# Patient Record
Sex: FEMALE | Race: WHITE
[De-identification: names, ages, dates, MRNs, and addresses within clinical notes are randomized per-mention and may not be internally consistent; named-entity substitution may affect disease eponyms.]

---

## 2023-01-11 ENCOUNTER — HOSPITAL ENCOUNTER (INPATIENT)
Dept: HOSPITAL 54 - ER | Age: 78
LOS: 6 days | Discharge: NURSING, REHAB OR LONG-TERM CARE | DRG: 193 | End: 2023-01-17
Attending: INTERNAL MEDICINE | Admitting: NURSE PRACTITIONER
Payer: MEDICARE

## 2023-01-11 VITALS — HEIGHT: 64 IN | BODY MASS INDEX: 25.61 KG/M2 | WEIGHT: 150 LBS

## 2023-01-11 VITALS — DIASTOLIC BLOOD PRESSURE: 97 MMHG | SYSTOLIC BLOOD PRESSURE: 149 MMHG

## 2023-01-11 VITALS — DIASTOLIC BLOOD PRESSURE: 68 MMHG | SYSTOLIC BLOOD PRESSURE: 89 MMHG

## 2023-01-11 DIAGNOSIS — L89.896: ICD-10-CM

## 2023-01-11 DIAGNOSIS — R53.2: ICD-10-CM

## 2023-01-11 DIAGNOSIS — M24.562: ICD-10-CM

## 2023-01-11 DIAGNOSIS — E87.6: ICD-10-CM

## 2023-01-11 DIAGNOSIS — K29.40: ICD-10-CM

## 2023-01-11 DIAGNOSIS — L89.319: ICD-10-CM

## 2023-01-11 DIAGNOSIS — E11.22: ICD-10-CM

## 2023-01-11 DIAGNOSIS — F03.90: ICD-10-CM

## 2023-01-11 DIAGNOSIS — E88.09: ICD-10-CM

## 2023-01-11 DIAGNOSIS — E87.0: ICD-10-CM

## 2023-01-11 DIAGNOSIS — I12.9: ICD-10-CM

## 2023-01-11 DIAGNOSIS — K21.9: ICD-10-CM

## 2023-01-11 DIAGNOSIS — J15.9: Primary | ICD-10-CM

## 2023-01-11 DIAGNOSIS — Z79.899: ICD-10-CM

## 2023-01-11 DIAGNOSIS — E86.0: ICD-10-CM

## 2023-01-11 DIAGNOSIS — Z79.01: ICD-10-CM

## 2023-01-11 DIAGNOSIS — J98.11: ICD-10-CM

## 2023-01-11 DIAGNOSIS — Z20.822: ICD-10-CM

## 2023-01-11 DIAGNOSIS — Z79.84: ICD-10-CM

## 2023-01-11 DIAGNOSIS — N18.9: ICD-10-CM

## 2023-01-11 DIAGNOSIS — R13.10: ICD-10-CM

## 2023-01-11 DIAGNOSIS — D63.8: ICD-10-CM

## 2023-01-11 DIAGNOSIS — R62.7: ICD-10-CM

## 2023-01-11 DIAGNOSIS — I25.2: ICD-10-CM

## 2023-01-11 DIAGNOSIS — Z91.018: ICD-10-CM

## 2023-01-11 DIAGNOSIS — Z86.73: ICD-10-CM
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
## 2023-01-11 DIAGNOSIS — E87.8: ICD-10-CM

## 2023-01-11 DIAGNOSIS — I25.10: ICD-10-CM

## 2023-01-11 DIAGNOSIS — J90: ICD-10-CM

## 2023-01-11 DIAGNOSIS — M24.561: ICD-10-CM

## 2023-01-11 DIAGNOSIS — L89.156: ICD-10-CM

## 2023-01-11 DIAGNOSIS — K74.60: ICD-10-CM

## 2023-01-11 LAB
ALBUMIN SERPL BCP-MCNC: 0.9 G/DL (ref 3.4–5)
ALP SERPL-CCNC: 112 U/L (ref 46–116)
ALT SERPL W P-5'-P-CCNC: 8 U/L (ref 12–78)
AST SERPL W P-5'-P-CCNC: 26 U/L (ref 15–37)
BASOPHILS # BLD AUTO: 0 K/UL (ref 0–0.2)
BASOPHILS NFR BLD AUTO: 0.3 % (ref 0–2)
BILIRUB DIRECT SERPL-MCNC: 0.3 MG/DL (ref 0–0.2)
BILIRUB SERPL-MCNC: 0.6 MG/DL (ref 0.2–1)
BILIRUB UR QL STRIP: (no result)
BUN SERPL-MCNC: 16 MG/DL (ref 7–18)
CALCIUM SERPL-MCNC: 7.2 MG/DL (ref 8.5–10.1)
CHLORIDE SERPL-SCNC: 130 MMOL/L (ref 98–107)
CO2 SERPL-SCNC: 18 MMOL/L (ref 21–32)
COLOR UR: YELLOW
CREAT SERPL-MCNC: 1.1 MG/DL (ref 0.6–1.3)
DEPRECATED SQUAMOUS URNS QL MICRO: (no result) /HPF
EOSINOPHIL NFR BLD AUTO: 3.6 % (ref 0–6)
GLUCOSE SERPL-MCNC: 88 MG/DL (ref 74–106)
GLUCOSE UR STRIP-MCNC: NEGATIVE MG/DL
HCT VFR BLD AUTO: 35 % (ref 33–45)
HGB BLD-MCNC: 10.5 G/DL (ref 11.5–14.8)
LEUKOCYTE ESTERASE UR QL STRIP: NEGATIVE
LYMPHOCYTES NFR BLD AUTO: 3.8 K/UL (ref 0.8–4.8)
LYMPHOCYTES NFR BLD AUTO: 33.6 % (ref 20–44)
LYMPHOCYTES NFR BLD MANUAL: 22 % (ref 16–48)
MCHC RBC AUTO-ENTMCNC: 30 G/DL (ref 31–36)
MCV RBC AUTO: 116 FL (ref 82–100)
MONOCYTES NFR BLD AUTO: 0.6 K/UL (ref 0.1–1.3)
MONOCYTES NFR BLD AUTO: 5.3 % (ref 2–12)
MONOCYTES NFR BLD MANUAL: 3 % (ref 0–11)
NEUTROPHILS # BLD AUTO: 6.5 K/UL (ref 1.8–8.9)
NEUTROPHILS NFR BLD AUTO: 57.2 % (ref 43–81)
NEUTS BAND NFR BLD MANUAL: 1 % (ref 0–5)
NEUTS SEG NFR BLD MANUAL: 74 % (ref 42–76)
NITRITE UR QL STRIP: NEGATIVE
PH UR STRIP: 5.5 [PH] (ref 5–8)
PLATELET # BLD AUTO: 428 K/UL (ref 150–450)
POTASSIUM SERPL-SCNC: 3.9 MMOL/L (ref 3.5–5.1)
PROT SERPL-MCNC: 5.6 G/DL (ref 6.4–8.2)
PROT UR QL STRIP: NEGATIVE MG/DL
RBC # BLD AUTO: 3.06 MIL/UL (ref 4–5.2)
RBC #/AREA URNS HPF: (no result) /HPF (ref 0–2)
SODIUM SERPL-SCNC: 158 MMOL/L (ref 136–145)
UROBILINOGEN UR STRIP-MCNC: 0.2 EU/DL
WBC #/AREA URNS HPF: (no result) /HPF
WBC #/AREA URNS HPF: (no result) /HPF (ref 0–3)
WBC NRBC COR # BLD AUTO: 11.4 K/UL (ref 4.3–11)

## 2023-01-11 PROCEDURE — G0378 HOSPITAL OBSERVATION PER HR: HCPCS

## 2023-01-11 PROCEDURE — C9113 INJ PANTOPRAZOLE SODIUM, VIA: HCPCS

## 2023-01-11 PROCEDURE — C9803 HOPD COVID-19 SPEC COLLECT: HCPCS

## 2023-01-11 RX ADMIN — HEPARIN SODIUM SCH UNITS: 5000 INJECTION INTRAVENOUS; SUBCUTANEOUS at 21:37

## 2023-01-11 RX ADMIN — EZETIMIBE SCH MG: 10 TABLET ORAL at 21:36

## 2023-01-11 RX ADMIN — DEXTROSE MONOHYDRATE SCH MLS/HR: 50 INJECTION, SOLUTION INTRAVENOUS at 21:52

## 2023-01-11 RX ADMIN — DEXTROSE AND SODIUM CHLORIDE PRN MLS/HR: 5; 450 INJECTION, SOLUTION INTRAVENOUS at 21:22

## 2023-01-11 NOTE — NUR
TEL  RN  ADMITTING  NOTES  



RECIEVED PATIENT  FROM  ER  [facility]IA [facility]  ,  WITH  DX  OF  FAILURE   TO  THRIVE   , FULL  CODE 
 ,  A/O  X0   , NON VERBAL AND   OPEN EYES   ONLY ,   ROOM  AIR   AND  TOLERATED  WELL  , NO 
 OB OR   DISTRESS  NOTED  AND  NO  S/S  OF   PAIN  AND  DISCOMFORT  NOTED  ,    BODY  
ASSESSMENT DONE  AND  PHOTOS  WERE  TAKEN ,   KEPT   CLEAN AND  DRY  ,   V/S  TAKEN AND  
RECORDED  ,  TELE  MONITOR   ATTACHED   READING OF   SR  WITH  BBB  PAC  AND  HR  OF  82  ,  
SACRAL  DTI  , BOTH HEEL  REDNESS  ,   LEFT  FOOT  DTI  SITE  #1  AND   2  AND   RIGHT  FOOT 
 DTI  SITE   #1 AND  2   AND   WOUND  CONSULT  ORDERED  ,   IV  ACCESS  ON  LAC  #20G  ,  
SAFETY  PRECAUTIONS  PROVIDED ,   SR  UP  X  2 , KEPT   COMFORTABLE   TO BED  , FAMILY  AT 
BEDSIDE  ,   ALL  NEEDS  ATTENDED  , ENDORSED  TO  NEXT SHIFT

## 2023-01-11 NOTE — NUR
PT TRANSFERRED -2 VIA [facility] ACLS PROTOCOL. WARM HANDOFF GIVEN TO BRANT WOODY.

## 2023-01-11 NOTE — NUR
MS LVN INITIAL NOTES

RECEIVED PT  LYING IN BED WITH EYES CLOSED BUT AROUSE EASILY WHEN YOU CALLED HER NAME AND 
TOUCH. RESPIRATION EVEN AND NON-LABORED. SKIN WARM TO TOUCH.  NOT IN ANY ACUTE DISTRESS 
NOTED. RE-ORIENTED WHERE SHE AT AND HOW TO USED THE CALL LIGHT SYSTEM. KEPT HER WARM AND 
COMFORTABLE AT ALL TIMES. BED IN LOW AND LOCK IN POSITION WITH SIDE RAILS X2 UP AND BED 
ALARM SET FOR SAFETY. WILL CONTINUE MONITORING.

## 2023-01-12 VITALS — SYSTOLIC BLOOD PRESSURE: 131 MMHG | DIASTOLIC BLOOD PRESSURE: 68 MMHG

## 2023-01-12 VITALS — SYSTOLIC BLOOD PRESSURE: 112 MMHG | DIASTOLIC BLOOD PRESSURE: 66 MMHG

## 2023-01-12 VITALS — SYSTOLIC BLOOD PRESSURE: 123 MMHG | DIASTOLIC BLOOD PRESSURE: 68 MMHG

## 2023-01-12 LAB
BASOPHILS # BLD AUTO: 0 K/UL (ref 0–0.2)
BASOPHILS NFR BLD AUTO: 0.3 % (ref 0–2)
BUN SERPL-MCNC: 15 MG/DL (ref 7–18)
CALCIUM SERPL-MCNC: 6.9 MG/DL (ref 8.5–10.1)
CHLORIDE SERPL-SCNC: 128 MMOL/L (ref 98–107)
CO2 SERPL-SCNC: 17 MMOL/L (ref 21–32)
CREAT SERPL-MCNC: 1.1 MG/DL (ref 0.6–1.3)
EOSINOPHIL NFR BLD AUTO: 2.7 % (ref 0–6)
GLUCOSE SERPL-MCNC: 115 MG/DL (ref 74–106)
HCT VFR BLD AUTO: 32 % (ref 33–45)
HGB BLD-MCNC: 9.8 G/DL (ref 11.5–14.8)
LYMPHOCYTES NFR BLD AUTO: 19.5 % (ref 20–44)
LYMPHOCYTES NFR BLD AUTO: 2.6 K/UL (ref 0.8–4.8)
MAGNESIUM SERPL-MCNC: 2.3 MG/DL (ref 1.8–2.4)
MCHC RBC AUTO-ENTMCNC: 30 G/DL (ref 31–36)
MCV RBC AUTO: 112 FL (ref 82–100)
MONOCYTES NFR BLD AUTO: 0.5 K/UL (ref 0.1–1.3)
MONOCYTES NFR BLD AUTO: 3.8 % (ref 2–12)
NEUTROPHILS # BLD AUTO: 10 K/UL (ref 1.8–8.9)
NEUTROPHILS NFR BLD AUTO: 73.7 % (ref 43–81)
PHOSPHATE SERPL-MCNC: 3 MG/DL (ref 2.5–4.9)
PLATELET # BLD AUTO: 430 K/UL (ref 150–450)
POTASSIUM SERPL-SCNC: 3.2 MMOL/L (ref 3.5–5.1)
RBC # BLD AUTO: 2.87 MIL/UL (ref 4–5.2)
SODIUM SERPL-SCNC: 156 MMOL/L (ref 136–145)
TSH SERPL DL<=0.005 MIU/L-ACNC: 1.83 UIU/ML (ref 0.36–3.74)
WBC NRBC COR # BLD AUTO: 13.6 K/UL (ref 4.3–11)

## 2023-01-12 PROCEDURE — 05HB33Z INSERTION OF INFUSION DEVICE INTO RIGHT BASILIC VEIN, PERCUTANEOUS APPROACH: ICD-10-PCS | Performed by: NURSE PRACTITIONER

## 2023-01-12 RX ADMIN — METOPROLOL TARTRATE SCH MG: 25 TABLET, FILM COATED ORAL at 17:00

## 2023-01-12 RX ADMIN — METFORMIN HYDROCHLORIDE SCH MG: 500 TABLET, FILM COATED ORAL at 09:00

## 2023-01-12 RX ADMIN — AMLODIPINE BESYLATE SCH MG: 5 TABLET ORAL at 09:00

## 2023-01-12 RX ADMIN — LOSARTAN POTASSIUM SCH MG: 50 TABLET, FILM COATED ORAL at 17:00

## 2023-01-12 RX ADMIN — RIVAROXABAN SCH MG: 10 TABLET, FILM COATED ORAL at 09:00

## 2023-01-12 RX ADMIN — Medication SCH ML: at 09:00

## 2023-01-12 RX ADMIN — METFORMIN HYDROCHLORIDE SCH MG: 500 TABLET, FILM COATED ORAL at 17:00

## 2023-01-12 RX ADMIN — HEPARIN SODIUM SCH UNITS: 5000 INJECTION INTRAVENOUS; SUBCUTANEOUS at 20:51

## 2023-01-12 RX ADMIN — Medication SCH MG: at 17:00

## 2023-01-12 RX ADMIN — MINERAL SUPPLEMENT IRON 300 MG / 5 ML STRENGTH LIQUID 100 PER BOX UNFLAVORED SCH MG: at 09:00

## 2023-01-12 RX ADMIN — Medication SCH TAB: at 09:00

## 2023-01-12 RX ADMIN — LOSARTAN POTASSIUM SCH MG: 50 TABLET, FILM COATED ORAL at 09:00

## 2023-01-12 RX ADMIN — METOPROLOL TARTRATE SCH MG: 25 TABLET, FILM COATED ORAL at 09:00

## 2023-01-12 RX ADMIN — EZETIMIBE SCH MG: 10 TABLET ORAL at 21:11

## 2023-01-12 RX ADMIN — DEXTROSE AND SODIUM CHLORIDE PRN MLS/HR: 5; 450 INJECTION, SOLUTION INTRAVENOUS at 20:17

## 2023-01-12 RX ADMIN — Medication SCH ML: at 13:00

## 2023-01-12 RX ADMIN — SODIUM CHLORIDE SCH MG: 9 INJECTION, SOLUTION INTRAVENOUS at 09:28

## 2023-01-12 RX ADMIN — DEXTROSE MONOHYDRATE SCH MLS/HR: 50 INJECTION, SOLUTION INTRAVENOUS at 20:15

## 2023-01-12 RX ADMIN — LATANOPROST SCH ML: 50 SOLUTION OPHTHALMIC at 21:10

## 2023-01-12 RX ADMIN — HEPARIN SODIUM SCH UNITS: 5000 INJECTION INTRAVENOUS; SUBCUTANEOUS at 09:28

## 2023-01-12 RX ADMIN — RIVAROXABAN SCH MG: 10 TABLET, FILM COATED ORAL at 17:00

## 2023-01-12 RX ADMIN — Medication SCH MG: at 09:00

## 2023-01-12 RX ADMIN — Medication SCH DROP: at 18:59

## 2023-01-12 NOTE — NUR
MS RN NOTES

KALPESH APRESOLINE 25MG PO AND ZETIA 10MG PO HELD,PATIENT FAILED SWALLOW EVAL PER SPEECH 
THERAPIST.PRONE TO ASPIRATION

## 2023-01-12 NOTE — NUR
ms rn

received on bed, sleeping easily arousable, not in any form of distress,respirations even 
and unlabored, no son noted, lungs are  diminish sounds,no s/sof pain at this time, 
repositioned for comfort,all needs attended.

## 2023-01-12 NOTE — NUR
LVN NOTES

 PT SLEEPING COMFORTABLY IN BED WITHOUT ANY ACUTE DISTRESS NOTED. IVF STILL INFUSING. KEPT 
HER WARM AND COMFORTABLE AT ALL TIMES. WILL CONTINUE MONITORING.

## 2023-01-12 NOTE — NUR
ms rn

held medications at this time, patient's daughter, wanted to speak w/ rebekah, gave 
daughter's tel number,rebekah will call her,.

## 2023-01-12 NOTE — NUR
MS RN NOTES

RECEIVED LAYING ON BED ON LEFT SIDE POSITION,BREATHING NON LABORED,NON VERBAL,OPEN EYES.NPO 
EXCEPT MEDS,PRESENT IVF INFUSING ON LEFT AC VIA IV PUMP,SITE PATENT,NO S/S OF INFILTRATION 
NOTED.WILL REPOSITION PER PROTOCOL,WILL CONTINUE TO MONITOR STATUS.

## 2023-01-12 NOTE — NUR
WOUND CARE CONSULT: PT PRESENTS WITH UPPER EXTREMITY DISCOLORATION, SACRAL SCARRING WHICH IS 
FRAGILE AND HAS SURROUNDING INTACT DEEP TISSUE INJURY, DEEP TISSUE INJURIES TO FEET AND 
LOWER EXTREMITY CONTRACTURES. DR MAGANA AND DR REY CALLED FOR SURGICAL AND DPM 
CONSULTS. DISCUSSED SKIN PROTECTION WITH NURSING STAFF. PT TO BE PLACED ON [facility] 
LOW AIRLOSS BED. MD IN AGREEMENT WITH PLAN OF CARE.

## 2023-01-13 VITALS — DIASTOLIC BLOOD PRESSURE: 50 MMHG | SYSTOLIC BLOOD PRESSURE: 95 MMHG

## 2023-01-13 VITALS — SYSTOLIC BLOOD PRESSURE: 122 MMHG | DIASTOLIC BLOOD PRESSURE: 68 MMHG

## 2023-01-13 VITALS — SYSTOLIC BLOOD PRESSURE: 100 MMHG | DIASTOLIC BLOOD PRESSURE: 55 MMHG

## 2023-01-13 LAB
BASOPHILS # BLD AUTO: 0 K/UL (ref 0–0.2)
BASOPHILS NFR BLD AUTO: 0.4 % (ref 0–2)
BUN SERPL-MCNC: 13 MG/DL (ref 7–18)
CALCIUM SERPL-MCNC: 7 MG/DL (ref 8.5–10.1)
CHLORIDE SERPL-SCNC: 128 MMOL/L (ref 98–107)
CO2 SERPL-SCNC: 18 MMOL/L (ref 21–32)
CREAT SERPL-MCNC: 1.1 MG/DL (ref 0.6–1.3)
EOSINOPHIL NFR BLD AUTO: 4.2 % (ref 0–6)
GLUCOSE SERPL-MCNC: 113 MG/DL (ref 74–106)
HCT VFR BLD AUTO: 28 % (ref 33–45)
HGB BLD-MCNC: 8.7 G/DL (ref 11.5–14.8)
LYMPHOCYTES NFR BLD AUTO: 2.9 K/UL (ref 0.8–4.8)
LYMPHOCYTES NFR BLD AUTO: 26.9 % (ref 20–44)
MAGNESIUM SERPL-MCNC: 2 MG/DL (ref 1.8–2.4)
MCHC RBC AUTO-ENTMCNC: 32 G/DL (ref 31–36)
MCV RBC AUTO: 110 FL (ref 82–100)
MONOCYTES NFR BLD AUTO: 0.6 K/UL (ref 0.1–1.3)
MONOCYTES NFR BLD AUTO: 5.5 % (ref 2–12)
NEUTROPHILS # BLD AUTO: 6.7 K/UL (ref 1.8–8.9)
NEUTROPHILS NFR BLD AUTO: 63 % (ref 43–81)
PHOSPHATE SERPL-MCNC: 2.7 MG/DL (ref 2.5–4.9)
PLATELET # BLD AUTO: 371 K/UL (ref 150–450)
POTASSIUM SERPL-SCNC: 2.8 MMOL/L (ref 3.5–5.1)
RBC # BLD AUTO: 2.51 MIL/UL (ref 4–5.2)
SODIUM SERPL-SCNC: 157 MMOL/L (ref 136–145)
WBC NRBC COR # BLD AUTO: 10.6 K/UL (ref 4.3–11)

## 2023-01-13 RX ADMIN — AMLODIPINE BESYLATE SCH MG: 5 TABLET ORAL at 09:00

## 2023-01-13 RX ADMIN — MINERAL SUPPLEMENT IRON 300 MG / 5 ML STRENGTH LIQUID 100 PER BOX UNFLAVORED SCH MG: at 09:00

## 2023-01-13 RX ADMIN — LOSARTAN POTASSIUM SCH MG: 50 TABLET, FILM COATED ORAL at 09:00

## 2023-01-13 RX ADMIN — SODIUM CHLORIDE, SODIUM LACTATE, POTASSIUM CHLORIDE, AND CALCIUM CHLORIDE SCH MLS/HR: .6; .31; .03; .02 INJECTION, SOLUTION INTRAVENOUS at 12:23

## 2023-01-13 RX ADMIN — METFORMIN HYDROCHLORIDE SCH MG: 500 TABLET, FILM COATED ORAL at 17:00

## 2023-01-13 RX ADMIN — Medication SCH ML: at 19:00

## 2023-01-13 RX ADMIN — Medication SCH MG: at 09:00

## 2023-01-13 RX ADMIN — HEPARIN SODIUM SCH UNITS: 5000 INJECTION INTRAVENOUS; SUBCUTANEOUS at 21:19

## 2023-01-13 RX ADMIN — Medication SCH ML: at 11:00

## 2023-01-13 RX ADMIN — METOPROLOL TARTRATE SCH MG: 25 TABLET, FILM COATED ORAL at 09:00

## 2023-01-13 RX ADMIN — Medication SCH DROP: at 16:55

## 2023-01-13 RX ADMIN — LOSARTAN POTASSIUM SCH MG: 50 TABLET, FILM COATED ORAL at 16:36

## 2023-01-13 RX ADMIN — SODIUM CHLORIDE SCH MG: 9 INJECTION, SOLUTION INTRAVENOUS at 09:53

## 2023-01-13 RX ADMIN — METOPROLOL TARTRATE SCH MG: 25 TABLET, FILM COATED ORAL at 16:36

## 2023-01-13 RX ADMIN — Medication SCH DROP: at 12:39

## 2023-01-13 RX ADMIN — Medication SCH MG: at 17:00

## 2023-01-13 RX ADMIN — Medication SCH TAB: at 09:00

## 2023-01-13 RX ADMIN — LATANOPROST SCH ML: 50 SOLUTION OPHTHALMIC at 21:18

## 2023-01-13 RX ADMIN — EZETIMIBE SCH MG: 10 TABLET ORAL at 21:19

## 2023-01-13 RX ADMIN — RIVAROXABAN SCH MG: 10 TABLET, FILM COATED ORAL at 17:00

## 2023-01-13 RX ADMIN — DEXTROSE MONOHYDRATE SCH MLS/HR: 50 INJECTION, SOLUTION INTRAVENOUS at 19:31

## 2023-01-13 RX ADMIN — Medication SCH DROP: at 11:14

## 2023-01-13 RX ADMIN — HEPARIN SODIUM SCH UNITS: 5000 INJECTION INTRAVENOUS; SUBCUTANEOUS at 09:54

## 2023-01-13 RX ADMIN — Medication SCH ML: at 17:00

## 2023-01-13 RX ADMIN — RIVAROXABAN SCH MG: 10 TABLET, FILM COATED ORAL at 09:00

## 2023-01-13 RX ADMIN — METFORMIN HYDROCHLORIDE SCH MG: 500 TABLET, FILM COATED ORAL at 09:00

## 2023-01-13 NOTE — NUR
ms rn

patient on bed, awake, non verbal patient, not in any form of distress, respirations even 
and unlabored,no sob noted, left arm edema noted, repositioned for comfort,all needs 
attended.

## 2023-01-13 NOTE — NUR
ms rn

held b/p meds, b/p on lower side, held colace, having loose stools, held xarelto, needs to 
verify md, patient already on heparin  q 12 hours, held glucophage, patient not eating,will 
monitor patient.

## 2023-01-13 NOTE — NUR
MS RN NOTES

NO SIGNIFICANT CHANGE IN STATUS.IVF INFUSING WELL ON LFA MIDLINE.MORNING CARE RENDERED,KEPT 
NPO,ASPIRATION PRECAUTION.IN NO ACUTE DISTRESS.

## 2023-01-13 NOTE — NUR
MS RN OPENING NOTES



Received patient on bed, awake and non verbal. No any signs of distress noted. Respirations 
even and unlabored. No sob noted. Safety measures given with bed on low position and locked. 
Side rails up x 2. Call light placed within reach. Will continue the plan of care.

## 2023-01-13 NOTE — NUR
ms rn

other daughter in the room asking to call saji first before ngt insertion, called saji 
will wait to call back.

## 2023-01-13 NOTE — NUR
ms rn

spoke w/ daughter Fiona , that we need to insert ngt to her mom, for water therapy,na is 
high and was agreed.

## 2023-01-14 VITALS — SYSTOLIC BLOOD PRESSURE: 106 MMHG | DIASTOLIC BLOOD PRESSURE: 66 MMHG

## 2023-01-14 VITALS — SYSTOLIC BLOOD PRESSURE: 95 MMHG | DIASTOLIC BLOOD PRESSURE: 47 MMHG

## 2023-01-14 VITALS — DIASTOLIC BLOOD PRESSURE: 60 MMHG | SYSTOLIC BLOOD PRESSURE: 110 MMHG

## 2023-01-14 LAB
BASOPHILS # BLD AUTO: 0 K/UL (ref 0–0.2)
BASOPHILS NFR BLD AUTO: 0.4 % (ref 0–2)
BUN SERPL-MCNC: 10 MG/DL (ref 7–18)
CALCIUM SERPL-MCNC: 6.9 MG/DL (ref 8.5–10.1)
CHLORIDE SERPL-SCNC: 123 MMOL/L (ref 98–107)
CO2 SERPL-SCNC: 19 MMOL/L (ref 21–32)
CREAT SERPL-MCNC: 1.1 MG/DL (ref 0.6–1.3)
EOSINOPHIL NFR BLD AUTO: 4.5 % (ref 0–6)
GLUCOSE SERPL-MCNC: 99 MG/DL (ref 74–106)
HCT VFR BLD AUTO: 29 % (ref 33–45)
HGB BLD-MCNC: 9.1 G/DL (ref 11.5–14.8)
LYMPHOCYTES NFR BLD AUTO: 3.6 K/UL (ref 0.8–4.8)
LYMPHOCYTES NFR BLD AUTO: 36.4 % (ref 20–44)
MAGNESIUM SERPL-MCNC: 1.9 MG/DL (ref 1.8–2.4)
MCHC RBC AUTO-ENTMCNC: 31 G/DL (ref 31–36)
MCV RBC AUTO: 114 FL (ref 82–100)
MONOCYTES NFR BLD AUTO: 0.6 K/UL (ref 0.1–1.3)
MONOCYTES NFR BLD AUTO: 6.4 % (ref 2–12)
NEUTROPHILS # BLD AUTO: 5.1 K/UL (ref 1.8–8.9)
NEUTROPHILS NFR BLD AUTO: 52.3 % (ref 43–81)
PHOSPHATE SERPL-MCNC: 2.6 MG/DL (ref 2.5–4.9)
PLATELET # BLD AUTO: 240 K/UL (ref 150–450)
POTASSIUM SERPL-SCNC: 3.8 MMOL/L (ref 3.5–5.1)
RBC # BLD AUTO: 2.59 MIL/UL (ref 4–5.2)
SODIUM SERPL-SCNC: 151 MMOL/L (ref 136–145)
WBC NRBC COR # BLD AUTO: 9.8 K/UL (ref 4.3–11)

## 2023-01-14 RX ADMIN — Medication SCH DROP: at 16:53

## 2023-01-14 RX ADMIN — Medication SCH MG: at 09:00

## 2023-01-14 RX ADMIN — RIVAROXABAN SCH MG: 10 TABLET, FILM COATED ORAL at 09:00

## 2023-01-14 RX ADMIN — Medication SCH ML: at 16:26

## 2023-01-14 RX ADMIN — RIVAROXABAN SCH MG: 10 TABLET, FILM COATED ORAL at 16:27

## 2023-01-14 RX ADMIN — AMLODIPINE BESYLATE SCH MG: 5 TABLET ORAL at 09:56

## 2023-01-14 RX ADMIN — Medication SCH ML: at 08:00

## 2023-01-14 RX ADMIN — Medication SCH MG: at 16:26

## 2023-01-14 RX ADMIN — DEXTROSE MONOHYDRATE SCH MLS/HR: 50 INJECTION, SOLUTION INTRAVENOUS at 21:08

## 2023-01-14 RX ADMIN — Medication SCH ML: at 08:52

## 2023-01-14 RX ADMIN — Medication SCH DROP: at 14:10

## 2023-01-14 RX ADMIN — METOPROLOL TARTRATE SCH MG: 25 TABLET, FILM COATED ORAL at 09:56

## 2023-01-14 RX ADMIN — LOSARTAN POTASSIUM SCH MG: 50 TABLET, FILM COATED ORAL at 09:56

## 2023-01-14 RX ADMIN — HEPARIN SODIUM SCH UNITS: 5000 INJECTION INTRAVENOUS; SUBCUTANEOUS at 21:00

## 2023-01-14 RX ADMIN — SODIUM CHLORIDE SCH MG: 9 INJECTION, SOLUTION INTRAVENOUS at 09:56

## 2023-01-14 RX ADMIN — Medication SCH TAB: at 09:55

## 2023-01-14 RX ADMIN — LOSARTAN POTASSIUM SCH MG: 50 TABLET, FILM COATED ORAL at 16:26

## 2023-01-14 RX ADMIN — METOPROLOL TARTRATE SCH MG: 25 TABLET, FILM COATED ORAL at 16:27

## 2023-01-14 RX ADMIN — METFORMIN HYDROCHLORIDE SCH MG: 500 TABLET, FILM COATED ORAL at 16:27

## 2023-01-14 RX ADMIN — METFORMIN HYDROCHLORIDE SCH MG: 500 TABLET, FILM COATED ORAL at 09:00

## 2023-01-14 RX ADMIN — HEPARIN SODIUM SCH UNITS: 5000 INJECTION INTRAVENOUS; SUBCUTANEOUS at 09:57

## 2023-01-14 RX ADMIN — LATANOPROST SCH ML: 50 SOLUTION OPHTHALMIC at 22:51

## 2023-01-14 RX ADMIN — Medication SCH MG: at 09:55

## 2023-01-14 RX ADMIN — SODIUM CHLORIDE, SODIUM LACTATE, POTASSIUM CHLORIDE, AND CALCIUM CHLORIDE SCH MLS/HR: .6; .31; .03; .02 INJECTION, SOLUTION INTRAVENOUS at 02:50

## 2023-01-14 RX ADMIN — Medication SCH DROP: at 11:09

## 2023-01-14 RX ADMIN — MINERAL SUPPLEMENT IRON 300 MG / 5 ML STRENGTH LIQUID 100 PER BOX UNFLAVORED SCH MG: at 09:55

## 2023-01-14 RX ADMIN — EZETIMIBE SCH MG: 10 TABLET ORAL at 22:00

## 2023-01-14 RX ADMIN — SODIUM CHLORIDE, SODIUM LACTATE, POTASSIUM CHLORIDE, AND CALCIUM CHLORIDE SCH MLS/HR: .6; .31; .03; .02 INJECTION, SOLUTION INTRAVENOUS at 16:45

## 2023-01-14 NOTE — NUR
ms rn

received on bed, nonverbal,responsive to stimuli, not in any form of distress, respirations 
even and unlabored breathing, no sob noted, ngt intact and in place w/o 
residual.repositioned for comfort,all needs attended.

## 2023-01-14 NOTE — NUR
MS RN CLOSING NOTES



Patient on bed awake, opening eyes and non verbal. No any signs of distress noted. 
Respirations even and unlabored. No sob noted. Patient has MIDLINE at VANESA with K Cl 20meq 
D5W running 70mls/hr, infusing well. Has NGT at the L nares. Safety measures given with bed 
on low position and locked. Side rails up x 2. Call light placed within reach. Will endorse 
to the next shift for continuity of care.

## 2023-01-14 NOTE — NUR
MS RN CLOSING NOTES



PATIENT LAYING IN BED, ALERT BUT NON-VERBAL, TOLERATING WELL ON ROOM AIR WITH NO S/S 
RESPIRATORY DISTRESS. NO COMPLAINTS OF PAIN OR DISCOMFORT AT THIS TIME. NG TUBE IN PLACE IN 
L NARE, CLAMPED. VANESA MIDLINE IN PLACE WITH D5W + 20 MEQ POTASSIUM INFUSING @ 70 ML/HR. 
SAFETY MEASURES IN PLACE: BED IN LOWEST LOCKED POSITION, SIDE RAILS UP X 2, CALL LIGHT 
WITHIN REACH. ALL NEEDS MET. WILL ENDORSE TO NIGHT SHIFT FOR KAMERON.

## 2023-01-14 NOTE — NUR
MS LVN INITIAL NOTES

 RECEIVED REPORT FROM AM NURSE ROSEANNA AND CHECKED PT SHE'S LYING IN BED WITH EYES CLOSED 
AROUSE TO TOUCH . RESPIRATION EVEN AND UNLABORED, NOT IN ANY ACUTE DISTRESS NOTED STILL IVF 
OF D51/2 NS +20MEQ K at 70ml/hr INFUSING ON HER RIGHT UPPER  ARM MIDLINE,PATENT AND INTACT. 
NOTICED HER LEFT ARM SWOLLEN REPOSITION ON PILLOWS . KEPT HER WARM AND COMFORTABLE AT ALL 
TIMES. WILL CONTINUE MONITORING.

## 2023-01-15 VITALS — DIASTOLIC BLOOD PRESSURE: 52 MMHG | SYSTOLIC BLOOD PRESSURE: 90 MMHG

## 2023-01-15 VITALS — DIASTOLIC BLOOD PRESSURE: 56 MMHG | SYSTOLIC BLOOD PRESSURE: 98 MMHG

## 2023-01-15 VITALS — SYSTOLIC BLOOD PRESSURE: 90 MMHG | DIASTOLIC BLOOD PRESSURE: 44 MMHG

## 2023-01-15 LAB
BASOPHILS # BLD AUTO: 0 K/UL (ref 0–0.2)
BASOPHILS NFR BLD AUTO: 0.2 % (ref 0–2)
BUN SERPL-MCNC: 7 MG/DL (ref 7–18)
CALCIUM SERPL-MCNC: 6.8 MG/DL (ref 8.5–10.1)
CHLORIDE SERPL-SCNC: 117 MMOL/L (ref 98–107)
CO2 SERPL-SCNC: 15 MMOL/L (ref 21–32)
CREAT SERPL-MCNC: 0.9 MG/DL (ref 0.6–1.3)
EOSINOPHIL NFR BLD AUTO: 4.8 % (ref 0–6)
EOSINOPHIL NFR BLD MANUAL: 1 % (ref 0–4)
GLUCOSE SERPL-MCNC: 107 MG/DL (ref 74–106)
HCT VFR BLD AUTO: 28 % (ref 33–45)
HGB BLD-MCNC: 8.8 G/DL (ref 11.5–14.8)
LYMPHOCYTES NFR BLD AUTO: 2.7 K/UL (ref 0.8–4.8)
LYMPHOCYTES NFR BLD AUTO: 32.7 % (ref 20–44)
LYMPHOCYTES NFR BLD MANUAL: 25 % (ref 16–48)
MAGNESIUM SERPL-MCNC: 1.9 MG/DL (ref 1.8–2.4)
MCHC RBC AUTO-ENTMCNC: 32 G/DL (ref 31–36)
MCV RBC AUTO: 115 FL (ref 82–100)
MONOCYTES NFR BLD AUTO: 0.6 K/UL (ref 0.1–1.3)
MONOCYTES NFR BLD AUTO: 7.5 % (ref 2–12)
MONOCYTES NFR BLD MANUAL: 9 % (ref 0–11)
NEUTROPHILS # BLD AUTO: 4.5 K/UL (ref 1.8–8.9)
NEUTROPHILS NFR BLD AUTO: 54.8 % (ref 43–81)
NEUTS SEG NFR BLD MANUAL: 65 % (ref 42–76)
PHOSPHATE SERPL-MCNC: 2.5 MG/DL (ref 2.5–4.9)
PLATELET # BLD AUTO: 295 K/UL (ref 150–450)
POTASSIUM SERPL-SCNC: 3.5 MMOL/L (ref 3.5–5.1)
RBC # BLD AUTO: 2.41 MIL/UL (ref 4–5.2)
SODIUM SERPL-SCNC: 142 MMOL/L (ref 136–145)
WBC NRBC COR # BLD AUTO: 8.1 K/UL (ref 4.3–11)

## 2023-01-15 RX ADMIN — METOPROLOL TARTRATE SCH MG: 25 TABLET, FILM COATED ORAL at 09:00

## 2023-01-15 RX ADMIN — Medication SCH ML: at 08:00

## 2023-01-15 RX ADMIN — Medication SCH DROP: at 09:18

## 2023-01-15 RX ADMIN — SODIUM CHLORIDE, SODIUM LACTATE, POTASSIUM CHLORIDE, AND CALCIUM CHLORIDE SCH MLS/HR: .6; .31; .03; .02 INJECTION, SOLUTION INTRAVENOUS at 21:17

## 2023-01-15 RX ADMIN — LOSARTAN POTASSIUM SCH MG: 50 TABLET, FILM COATED ORAL at 09:00

## 2023-01-15 RX ADMIN — Medication SCH DROP: at 12:48

## 2023-01-15 RX ADMIN — LOSARTAN POTASSIUM SCH MG: 50 TABLET, FILM COATED ORAL at 16:51

## 2023-01-15 RX ADMIN — Medication SCH ML: at 17:21

## 2023-01-15 RX ADMIN — METFORMIN HYDROCHLORIDE SCH MG: 500 TABLET, FILM COATED ORAL at 16:55

## 2023-01-15 RX ADMIN — EZETIMIBE SCH MG: 10 TABLET ORAL at 21:17

## 2023-01-15 RX ADMIN — Medication SCH MG: at 09:00

## 2023-01-15 RX ADMIN — Medication SCH TAB: at 09:00

## 2023-01-15 RX ADMIN — HEPARIN SODIUM SCH UNITS: 5000 INJECTION INTRAVENOUS; SUBCUTANEOUS at 09:00

## 2023-01-15 RX ADMIN — Medication SCH DROP: at 17:14

## 2023-01-15 RX ADMIN — RIVAROXABAN SCH MG: 10 TABLET, FILM COATED ORAL at 09:00

## 2023-01-15 RX ADMIN — Medication SCH MG: at 16:55

## 2023-01-15 RX ADMIN — DEXTROSE MONOHYDRATE SCH MLS/HR: 50 INJECTION, SOLUTION INTRAVENOUS at 20:59

## 2023-01-15 RX ADMIN — SODIUM CHLORIDE, SODIUM LACTATE, POTASSIUM CHLORIDE, AND CALCIUM CHLORIDE SCH MLS/HR: .6; .31; .03; .02 INJECTION, SOLUTION INTRAVENOUS at 08:04

## 2023-01-15 RX ADMIN — METOPROLOL TARTRATE SCH MG: 25 TABLET, FILM COATED ORAL at 16:52

## 2023-01-15 RX ADMIN — RIVAROXABAN SCH MG: 10 TABLET, FILM COATED ORAL at 16:54

## 2023-01-15 RX ADMIN — Medication PRN ML: at 12:47

## 2023-01-15 RX ADMIN — METFORMIN HYDROCHLORIDE SCH MG: 500 TABLET, FILM COATED ORAL at 09:00

## 2023-01-15 RX ADMIN — AMLODIPINE BESYLATE SCH MG: 5 TABLET ORAL at 09:00

## 2023-01-15 RX ADMIN — HEPARIN SODIUM SCH UNITS: 5000 INJECTION INTRAVENOUS; SUBCUTANEOUS at 21:00

## 2023-01-15 RX ADMIN — MINERAL SUPPLEMENT IRON 300 MG / 5 ML STRENGTH LIQUID 100 PER BOX UNFLAVORED SCH MG: at 09:00

## 2023-01-15 RX ADMIN — LATANOPROST SCH ML: 50 SOLUTION OPHTHALMIC at 21:18

## 2023-01-15 RX ADMIN — PANTOPRAZOLE SODIUM SCH MG: 40 TABLET, DELAYED RELEASE ORAL at 09:00

## 2023-01-15 NOTE — NUR
RN CLOSING NOTES 





PATIENT LYING IN BED ASLEEP. NON-VERBAL ABLE TO OPEN EYES. ABLE TO TOLERATE ROOM AIR. NO S/S 
OF RESPIRATORY DISTRESS AND ASPIRATION NOTED. WITH ONGOING NASOGASTRIC TUBE FEEDING JEVITY 
RUNNING AT 30ML/HR. IVF AT RAC D5 1/2 NS WITH 20 MEQS KCL AT 70ML/HR. FOR G-TUBE PLACEMENT 
AT 6:30 ON 1/16/2023. PATIENT WILL BE NPO AT POST MIDNIGHT FOR SURGERY. SAFETY PRECAUTIONS 
MAINTAINED. ALL NEEDS MET. WILL ENDORSED TO NIGHT SHIFT FOR KAMERON.

## 2023-01-15 NOTE — NUR
ms lvn closing notes

 pt stable throughout the night. no aspiration noted. IVF still infusing. kept her warm and 
comfortable at all times. reposition pt for comfort. will endorse to am nurse for continuity 
of care.

## 2023-01-15 NOTE — NUR
RN NOTE



Per instruction from Flores Solitario NP, hold Heparin scheduled for 2100. Will relay to 
night shift nurse.

## 2023-01-15 NOTE — NUR
RN OPENING NOTE 



RECEIVED PATIENT ASLEEP. NON-VERBAL ONLY OPEN EYES. PT HAS NG TUBE TO LEFT NARES. ON ROOM 
AIR, AND TOLERATING RA WELL.  MIDLINE TO VANESA RUNNING D5 NS WITH 1/2 20 MEQ KCL AT 70ML/HR, 
INFUSING WELL. NOTED SACRAL SCAR AND DTI. SAFETY PRECAUTIONS MAINTAINED. BED AT LOWEST 
POSITION, SR UP X2. CALL LIGHT WITHIN REACH. WILL CONTINUE TO MONITOR PATIENT ACCORDINGLY.

## 2023-01-15 NOTE — NUR
ms lvn notes

 pt seen in bed respiration even and unlabored, not in any acute distress noted. Skin warm 
to touch, reposition her for comfort. kept her warm and comfortable at all times. IVF still 
infusing patent and intact. will continue monitoring.

## 2023-01-15 NOTE — NUR
RN NOTES 



NASOGASTRIC TUBE FEEDING RESUMED. FOR G-TUBE PLACEMENT AT 8:00 ON 1/16/2023. NPO MIDNIGHT

## 2023-01-16 VITALS — SYSTOLIC BLOOD PRESSURE: 106 MMHG | DIASTOLIC BLOOD PRESSURE: 62 MMHG

## 2023-01-16 VITALS — SYSTOLIC BLOOD PRESSURE: 106 MMHG | DIASTOLIC BLOOD PRESSURE: 60 MMHG

## 2023-01-16 VITALS — SYSTOLIC BLOOD PRESSURE: 99 MMHG | DIASTOLIC BLOOD PRESSURE: 56 MMHG

## 2023-01-16 VITALS — SYSTOLIC BLOOD PRESSURE: 130 MMHG | DIASTOLIC BLOOD PRESSURE: 74 MMHG

## 2023-01-16 LAB
BASOPHILS # BLD AUTO: 0 K/UL (ref 0–0.2)
BASOPHILS NFR BLD AUTO: 0.3 % (ref 0–2)
BASOPHILS NFR BLD MANUAL: 0 % (ref 0–2)
BUN SERPL-MCNC: 7 MG/DL (ref 7–18)
CALCIUM SERPL-MCNC: 7 MG/DL (ref 8.5–10.1)
CHLORIDE SERPL-SCNC: 114 MMOL/L (ref 98–107)
CO2 SERPL-SCNC: 14 MMOL/L (ref 21–32)
CREAT SERPL-MCNC: 0.7 MG/DL (ref 0.6–1.3)
EOSINOPHIL NFR BLD AUTO: 4.1 % (ref 0–6)
EOSINOPHIL NFR BLD MANUAL: 2 % (ref 0–4)
GLUCOSE SERPL-MCNC: 101 MG/DL (ref 74–106)
HCT VFR BLD AUTO: 27 % (ref 33–45)
HGB BLD-MCNC: 8.7 G/DL (ref 11.5–14.8)
LYMPHOCYTES NFR BLD AUTO: 2.4 K/UL (ref 0.8–4.8)
LYMPHOCYTES NFR BLD AUTO: 33.5 % (ref 20–44)
LYMPHOCYTES NFR BLD MANUAL: 26 % (ref 16–48)
MAGNESIUM SERPL-MCNC: 2 MG/DL (ref 1.8–2.4)
MCHC RBC AUTO-ENTMCNC: 32 G/DL (ref 31–36)
MCV RBC AUTO: 111 FL (ref 82–100)
MONOCYTES NFR BLD AUTO: 0.7 K/UL (ref 0.1–1.3)
MONOCYTES NFR BLD AUTO: 9.4 % (ref 2–12)
MONOCYTES NFR BLD MANUAL: 6 % (ref 0–11)
NEUTROPHILS # BLD AUTO: 3.9 K/UL (ref 1.8–8.9)
NEUTROPHILS NFR BLD AUTO: 52.7 % (ref 43–81)
NEUTS SEG NFR BLD MANUAL: 66 % (ref 42–76)
PHOSPHATE SERPL-MCNC: 3 MG/DL (ref 2.5–4.9)
PLATELET # BLD AUTO: 289 K/UL (ref 150–450)
POTASSIUM SERPL-SCNC: 4.2 MMOL/L (ref 3.5–5.1)
RBC # BLD AUTO: 2.44 MIL/UL (ref 4–5.2)
SODIUM SERPL-SCNC: 137 MMOL/L (ref 136–145)
WBC NRBC COR # BLD AUTO: 7.3 K/UL (ref 4.3–11)

## 2023-01-16 PROCEDURE — 0DH63UZ INSERTION OF FEEDING DEVICE INTO STOMACH, PERCUTANEOUS APPROACH: ICD-10-PCS | Performed by: INTERNAL MEDICINE

## 2023-01-16 RX ADMIN — Medication SCH TAB: at 09:21

## 2023-01-16 RX ADMIN — METOPROLOL TARTRATE SCH MG: 25 TABLET, FILM COATED ORAL at 16:54

## 2023-01-16 RX ADMIN — HEPARIN SODIUM SCH UNITS: 5000 INJECTION INTRAVENOUS; SUBCUTANEOUS at 22:30

## 2023-01-16 RX ADMIN — PANTOPRAZOLE SODIUM SCH MG: 40 TABLET, DELAYED RELEASE ORAL at 09:22

## 2023-01-16 RX ADMIN — HEPARIN SODIUM SCH UNITS: 5000 INJECTION INTRAVENOUS; SUBCUTANEOUS at 09:00

## 2023-01-16 RX ADMIN — MINERAL SUPPLEMENT IRON 300 MG / 5 ML STRENGTH LIQUID 100 PER BOX UNFLAVORED SCH MG: at 09:24

## 2023-01-16 RX ADMIN — LOSARTAN POTASSIUM SCH MG: 50 TABLET, FILM COATED ORAL at 16:53

## 2023-01-16 RX ADMIN — Medication SCH DROP: at 16:53

## 2023-01-16 RX ADMIN — Medication SCH DROP: at 09:20

## 2023-01-16 RX ADMIN — EZETIMIBE SCH MG: 10 TABLET ORAL at 22:46

## 2023-01-16 RX ADMIN — Medication SCH ML: at 08:00

## 2023-01-16 RX ADMIN — METFORMIN HYDROCHLORIDE SCH MG: 500 TABLET, FILM COATED ORAL at 09:22

## 2023-01-16 RX ADMIN — RIVAROXABAN SCH MG: 10 TABLET, FILM COATED ORAL at 09:00

## 2023-01-16 RX ADMIN — DEXTROSE MONOHYDRATE SCH MLS/HR: 50 INJECTION, SOLUTION INTRAVENOUS at 21:57

## 2023-01-16 RX ADMIN — SODIUM CHLORIDE, SODIUM LACTATE, POTASSIUM CHLORIDE, AND CALCIUM CHLORIDE SCH MLS/HR: .6; .31; .03; .02 INJECTION, SOLUTION INTRAVENOUS at 12:07

## 2023-01-16 RX ADMIN — METFORMIN HYDROCHLORIDE SCH MG: 500 TABLET, FILM COATED ORAL at 16:53

## 2023-01-16 RX ADMIN — Medication SCH MG: at 09:22

## 2023-01-16 RX ADMIN — Medication SCH MG: at 16:59

## 2023-01-16 RX ADMIN — LOSARTAN POTASSIUM SCH MG: 50 TABLET, FILM COATED ORAL at 09:22

## 2023-01-16 RX ADMIN — AMLODIPINE BESYLATE SCH MG: 5 TABLET ORAL at 09:28

## 2023-01-16 RX ADMIN — RIVAROXABAN SCH MG: 10 TABLET, FILM COATED ORAL at 17:00

## 2023-01-16 RX ADMIN — LATANOPROST SCH ML: 50 SOLUTION OPHTHALMIC at 22:47

## 2023-01-16 RX ADMIN — Medication SCH DROP: at 13:46

## 2023-01-16 RX ADMIN — Medication SCH ML: at 17:00

## 2023-01-16 RX ADMIN — METOPROLOL TARTRATE SCH MG: 25 TABLET, FILM COATED ORAL at 09:22

## 2023-01-16 NOTE — NUR
RN NOTES



PATIENT REMAIN STABLES WITH VITALS SIGNS OF BP:126/76, KY:82, O2 SAT:99, TEMP:98, RR:20. 
WILL CONTINUE TO MONITOR.

## 2023-01-16 NOTE — NUR
RN NOTES 



PATIENT REMAIN STABLES WITH VITALS SIGNS OF BP:127/74, SD:84, O2 SAT:100, TEMP:98, RR:20. 
WILL CONTINUE TO MONITOR.

## 2023-01-16 NOTE — NUR
MSRN

NOTIFIED MD REGARDING HEPARIN DOSE FOR TONIGHT, LOW HH.  MADE AWARE ALSO REGARDING K LEVEL 
4.2 AND IVF HAS 20MEQ IN 1 LITER D5W.  AWAITING FOR FURTHER ORDERS.

## 2023-01-16 NOTE — NUR
RN NOTES 



PATIENT REMAIN STABLES WITH VITALS SIGNS OF BP:125/70, FL:84, O2 SAT:99, TEMP:98.2, RR:20. 
WILL CONTINUE TO MONITOR.

## 2023-01-16 NOTE — NUR
RN NOTES 



RECEIVED PATIENT FROM OR S/P G-TUBE PLACEMENT. PEG TUBE SITE IS INTACT NO DISCHARGE AND 
BLEEDING NOTED. PATIENT IS STABLE WITH VITAL SIGNS AS FOLLOWS BP: 130/74, SD:82, O2 
SAT:100%, RR:20, TEMP:98. [facility] EXCEPT MEDICATIONS. AND 
TO START TUBE FEEDING TOMR. WILL CONTINUE TO MONITOR

## 2023-01-16 NOTE — NUR
RN NOTES 



PATIENT REMAIN STABLES WITH VITALS SIGNS OF BP:127/74, HI:77, O2 SAT:99, TEMP:98, RR:19. 
WILL CONTINUE TO MONITOR.

## 2023-01-16 NOTE — NUR
RN CLOSING NOTE 



PATIENT LYING IN BED ASLEEP. NON-VERBAL ABLE TO OPEN EYES. ABLE TO TOLERATE ROOM AIR. NO S/S 
OF RESPIRATORY DISTRESS AND ASPIRATION NOTED. PT HAS NASOGASTRIC TUBE FEEDING WITH JEVITY, 
BUT FEEDING WAS STOPPED SINCE 0000 BECAUSE PT WILL HAVE PEG TUBE PLACEMENT AT 0800. IVF TO 
VANESA D5 1/2 NS WITH 20 MEQS KCL AT 70ML/HR. PATIENT HAS BEEN NPO SINCE MIDNIGHT FOR SURGERY. 
OR NURSES PRESENT TO  PT. PT'S DTR CALLED, AND VERBAL CONSENT RECEIVED FOR BLOOD 
TRANSFUSION. ALL CONSENT SIGNED, PRE OP CHECK LIST COMPLETED. PATIENT TRANSPORTED VIA GURNEY 
BY OR NURSE TO OR, TO GET G TUBE PLACED. ALL NEEDS MET. WILL ENDORSED TO MORNING SHIFT NURSE 
FOR KAMERON.

## 2023-01-16 NOTE — NUR
RN CLOSING NOTES



PATIENT LAYING ON BED; ASLEEP. WITH ONGOING IVF D5 1/2 NS + 20 MEQS  AT 70ML/HR INFUSING 
WELL. NOTED EDEMA AT BOTH ARMS. STILL ON NPO EXCEPT MEDS. G-TUBE FEEDING RESUMES TOMR. ALL 
DUE MEDICATIONS GIVEN. PATIENT REMAINED STABLE ALL THOUGHOUT THE SHIFT. ALL NEDDS MET. 
SAFETY MEASURES MAINTAINED, BED IN LOWEST LOCKED POSITION. SIDE RAILS UP X 3, CALL LIGHT 
WITHININ REACHED. WILL ENDORSED TO NIGHT SHIFT FOR KAMERON.

## 2023-01-17 VITALS — SYSTOLIC BLOOD PRESSURE: 110 MMHG | DIASTOLIC BLOOD PRESSURE: 74 MMHG

## 2023-01-17 VITALS — SYSTOLIC BLOOD PRESSURE: 104 MMHG | DIASTOLIC BLOOD PRESSURE: 71 MMHG

## 2023-01-17 LAB
BASOPHILS # BLD AUTO: 0 K/UL (ref 0–0.2)
BASOPHILS NFR BLD AUTO: 0.2 % (ref 0–2)
BUN SERPL-MCNC: 5 MG/DL (ref 7–18)
CALCIUM SERPL-MCNC: 7.1 MG/DL (ref 8.5–10.1)
CHLORIDE SERPL-SCNC: 113 MMOL/L (ref 98–107)
CO2 SERPL-SCNC: 16 MMOL/L (ref 21–32)
CREAT SERPL-MCNC: 0.7 MG/DL (ref 0.6–1.3)
EOSINOPHIL NFR BLD AUTO: 3 % (ref 0–6)
EOSINOPHIL NFR BLD MANUAL: 4 % (ref 0–4)
GLUCOSE SERPL-MCNC: 99 MG/DL (ref 74–106)
HCT VFR BLD AUTO: 27 % (ref 33–45)
HGB BLD-MCNC: 8.7 G/DL (ref 11.5–14.8)
LYMPHOCYTES NFR BLD AUTO: 2.2 K/UL (ref 0.8–4.8)
LYMPHOCYTES NFR BLD AUTO: 31.4 % (ref 20–44)
LYMPHOCYTES NFR BLD MANUAL: 19 % (ref 16–48)
MAGNESIUM SERPL-MCNC: 1.9 MG/DL (ref 1.8–2.4)
MCHC RBC AUTO-ENTMCNC: 33 G/DL (ref 31–36)
MCV RBC AUTO: 108 FL (ref 82–100)
MONOCYTES NFR BLD AUTO: 0.7 K/UL (ref 0.1–1.3)
MONOCYTES NFR BLD AUTO: 9.8 % (ref 2–12)
MONOCYTES NFR BLD MANUAL: 5 % (ref 0–11)
NEUTROPHILS # BLD AUTO: 4 K/UL (ref 1.8–8.9)
NEUTROPHILS NFR BLD AUTO: 55.6 % (ref 43–81)
NEUTS BAND NFR BLD MANUAL: 2 % (ref 0–5)
NEUTS SEG NFR BLD MANUAL: 70 % (ref 42–76)
PHOSPHATE SERPL-MCNC: 2.8 MG/DL (ref 2.5–4.9)
PLATELET # BLD AUTO: 269 K/UL (ref 150–450)
POTASSIUM SERPL-SCNC: 3.8 MMOL/L (ref 3.5–5.1)
RBC # BLD AUTO: 2.47 MIL/UL (ref 4–5.2)
SODIUM SERPL-SCNC: 136 MMOL/L (ref 136–145)
WBC NRBC COR # BLD AUTO: 7.2 K/UL (ref 4.3–11)

## 2023-01-17 RX ADMIN — Medication SCH DROP: at 12:40

## 2023-01-17 RX ADMIN — SODIUM CHLORIDE, SODIUM LACTATE, POTASSIUM CHLORIDE, AND CALCIUM CHLORIDE SCH MLS/HR: .6; .31; .03; .02 INJECTION, SOLUTION INTRAVENOUS at 03:44

## 2023-01-17 RX ADMIN — AMLODIPINE BESYLATE SCH MG: 5 TABLET ORAL at 09:00

## 2023-01-17 RX ADMIN — Medication PRN ML: at 05:55

## 2023-01-17 RX ADMIN — HEPARIN SODIUM SCH UNITS: 5000 INJECTION INTRAVENOUS; SUBCUTANEOUS at 09:00

## 2023-01-17 RX ADMIN — Medication SCH TAB: at 09:36

## 2023-01-17 RX ADMIN — Medication SCH DROP: at 09:33

## 2023-01-17 NOTE — NUR
MSRN

STARTED GT FEEDING JEVITY AT 20 CC/HR.  MAINTAIN HOB 35 DEGREES.  AM CARE DONE, REPOSITIONED 
FOR COMFORT.

## 2023-01-17 NOTE — NUR
RN DISCHARGE NOTES



PATIENT DISCHARGED TO [facility] IN STABLE CONDITION. HOOKED TO OXYGEN VIA NASAL 
CANNULA AT 2LPM. NON-VERBAL BUT ABLE TO OPEN EYES. RIGHT UPPER ARM MIDLINE REMOVED AND KEPT 
WITH DRY PRESSURED DRESSING. PATIENT IS A BEDBOUND. NOT IN ANY FORM OF DISTRESS AT THIS 
TIME. PICTURES OF SACRAL AREA AND LEFT FOOT TAKEN AND PLACED IN CHART. PATIENT LEFT WITH 
STABLE CONDITION VIA AMBULANCE WITH 2 EMT PRESENT.

## 2023-01-17 NOTE — NUR
RN OPENING NOTES 



RECEIVED PATIENT LYING IN BED. HOOKED TO OXYGEN VIA NASAL CANNULA AT 2LPM. WITH ONGOING IVF 
OF D2 1/2 PLUS 20 MEQS KCL AT RIGHT MIDLINE INFUSING WELL. WITH ONGOING G-TUBE FEEDING 
JEVITY 1.2 NO RESIDUAL NOTED. SAFETY PRECAUTIONS MAINTAINED. KEPT SIDE RAILS UP ALL THE TIME 
X 3. KEPT BED AT LOWEST LOCKED POSITION. WILL CONTINUE TO MONITOR.

## 2024-05-30 NOTE — NUR
BMP/CBC/CMP/PT/PTT/INR/EKG/CXR RN OPENING NOTES 



RECEIVED PATIENT ASLEEP. NON-VERBAL ONLY OPEN EYES. WITH NGT ON LEFT NARES. AT ROOM AIR. 
WITH ONGOING D5 NS WITH 1/2 20MEQS KCL AT 70ML/HR INFUSING WELL AT RIGHT MIDLINE. MAINTAINED 
ON BED REST. NOTED SACRAL SCAR AND DTI.SAFETY PRECAUTIONS MAINTAINED. BED PLACED AT LOWEST 
POSITION, SR UP ALL TIMES/. CALL LIGHT WITHIN REACH  WILL CONTINUE TO MONITOR PATIENT 
ACCORDINGLY. HgBA1C 6.2/BMP/CBC/CMP/PT/PTT/INR/EKG/CXR